# Patient Record
Sex: FEMALE | Race: WHITE
[De-identification: names, ages, dates, MRNs, and addresses within clinical notes are randomized per-mention and may not be internally consistent; named-entity substitution may affect disease eponyms.]

---

## 2017-01-26 NOTE — PD
HPI


Chief Complaint:  Skin Problem


Time Seen by Provider:  07:05


Travel History


International Travel<30 days:  No


Contact w/Intl Traveler<30days:  No


Traveled to known affect area:  No





History of Present Illness


HPI


31-year-old female here for evaluation of a rash.  The patient reports that she 

joined a gym out 2-1/2 weeks ago.  About 2 weeks ago she noticed a rash over 

her abdomen and lower back.  Rash is pruritic.  No fevers or chills.  She 

thought that she may have ringworm and started to apply Lotrimin cream.  She 

noticed an initial improvement, however the rash has not resolved.  No 

intraoral lesions.





PFSH


Past Medical History


Medical History:  Denies Significant Hx


Asthma:  Yes


Bipolar Disorder:  Yes (PER PT AS A CHILD, PT ON NO MEDS)


Depression:  No


Diabetes:  No


Diminished Hearing:  No


Immunizations Current:  Yes


Seizures:  No


Tetanus Vaccination:  < 5 Years


Pregnant?:  Not Pregnant


:  4


Para:  1


Miscarriage:  1


:  1


Ectopic Pregnancy:  Yes (L FALLOPIAN TUBE REMOVED)


Tubal Ligation:  Yes





Past Surgical History


Arteriovenous Shunt:  No


Gynecologic Surgery:  Yes (FALLOPIAN TUBE REMOVAL)


Oral Surgery:  Yes (DENTAL ABSCESS)





Social History


Alcohol Use:  No


Tobacco Use:  Yes (/2 ppd)


Substance Use:  No





Allergies-Medications


(Allergen,Severity, Reaction):  


Coded Allergies:  


     No Known Allergies (Verified , 16)


Reported Meds & Prescriptions





Reported Meds & Active Scripts


Active


No Active Prescriptions or Reported Medications    








Review of Systems


Except as stated in HPI:  all other systems reviewed are Neg





Physical Exam


Narrative


GENERAL: Pleasant, well-developed, well-nourished, no acute distress.


SKIN: Warm and dry.  Numerous circular/scaly lesions on anterior and posterior 

mid section about the size of a dime, no warmth or erythema.  No petechiae.


HEAD: Atraumatic. Normocephalic. 


EYES: Pupils equal and round. No scleral icterus. No injection or drainage. 


ENT: Mucous membranes pink and moist.  No intraoral lesions.


NECK: Trachea midline. No JVD.  No nuchal rigidity.


CARDIOVASCULAR: Regular rate and rhythm.  


RESPIRATORY: No accessory muscle use. Clear to auscultation. Breath sounds 

equal bilaterally. 


GASTROINTESTINAL: Abdomen soft, non-tender, nondistended. 


MUSCULOSKELETAL: No obvious deformities. No clubbing.  No cyanosis.  No edema. 


NEUROLOGICAL: Awake and alert. No obvious cranial nerve deficits.  Motor 

grossly within normal limits. Normal speech.


PSYCHIATRIC: Appropriate mood and affect; insight and judgment normal.





Data


Data


Last Documented VS





Vital Signs








  Date Time  Temp Pulse Resp B/P Pulse Ox O2 Delivery O2 Flow Rate FiO2


 


17 07:07 98.0 86 16 139/91 98   











MDM


Medical Decision Making


Medical Screen Exam Complete:  Yes


Emergency Medical Condition:  Yes


Medical Record Reviewed:  Yes


Differential Diagnosis


Tenia corporis, insect bites, erythema multiforme, cellulitis unlikely, Guillermo 

Tacos syndrome not likely,


Narrative Course


This is a 31-year-old female who is here for evaluation of a rash.  There are 

numerous circular/scaly areas on the patient's mid section with raised borders.

  The appearance of the rash resembles tinea.  There are no intraoral lesions.  

Plan is to start the patient on clotrimazole cream, steroids, and Benadryl.  PMD

/dermatology follow-up this week.  She was informed on when to return to the 

emergency department.  She verbalizes understanding and agreement with plan.





Diagnosis





 Primary Impression:  


 Rash


Referrals:  


Dermatologist


3 days





Primary Care Physician


3 days


Patient Instructions:  General Instructions


Departure Forms:  Tests/Procedures





***Additional Instructions:


Take medications as prescribed.


Follow-up with a primary care physician or dermatologist this week.


Return to the emergency department if worsening symptoms or any other concerns.


Scripts


Prednisone 50 Mg Tab50 Mg PO DAILY  5 Days  Ref 0


   Prov:Addi Ozuna MD         17 


Clotrimazole Topical 1% Soln1 Applic TOPICAL BID  #30 ML  Ref 2


   Prov:Addi Ozuna MD         17


Disposition:  01 DISCHARGE HOME


Condition:  Stable








Addi Ozuna MD 2017 07:15

## 2018-03-12 ENCOUNTER — HOSPITAL ENCOUNTER (EMERGENCY)
Dept: HOSPITAL 17 - PHED | Age: 33
Discharge: HOME | End: 2018-03-12
Payer: SELF-PAY

## 2018-03-12 VITALS — SYSTOLIC BLOOD PRESSURE: 149 MMHG | DIASTOLIC BLOOD PRESSURE: 78 MMHG

## 2018-03-12 VITALS
SYSTOLIC BLOOD PRESSURE: 155 MMHG | HEART RATE: 98 BPM | OXYGEN SATURATION: 98 % | RESPIRATION RATE: 18 BRPM | DIASTOLIC BLOOD PRESSURE: 70 MMHG

## 2018-03-12 VITALS
TEMPERATURE: 98.3 F | RESPIRATION RATE: 18 BRPM | HEART RATE: 102 BPM | OXYGEN SATURATION: 97 % | SYSTOLIC BLOOD PRESSURE: 165 MMHG | DIASTOLIC BLOOD PRESSURE: 83 MMHG

## 2018-03-12 VITALS — OXYGEN SATURATION: 98 %

## 2018-03-12 DIAGNOSIS — J45.909: ICD-10-CM

## 2018-03-12 DIAGNOSIS — R07.89: Primary | ICD-10-CM

## 2018-03-12 DIAGNOSIS — F17.210: ICD-10-CM

## 2018-03-12 DIAGNOSIS — R00.0: ICD-10-CM

## 2018-03-12 LAB
ALBUMIN SERPL-MCNC: 3.2 GM/DL (ref 3.4–5)
ALP SERPL-CCNC: 97 U/L (ref 45–117)
ALT SERPL-CCNC: 24 U/L (ref 10–53)
AST SERPL-CCNC: 13 U/L (ref 15–37)
BASOPHILS # BLD AUTO: 0 TH/MM3 (ref 0–0.2)
BASOPHILS NFR BLD: 0.6 % (ref 0–2)
BILIRUB SERPL-MCNC: 0.1 MG/DL (ref 0.2–1)
BUN SERPL-MCNC: 10 MG/DL (ref 7–18)
CALCIUM SERPL-MCNC: 8.2 MG/DL (ref 8.5–10.1)
CHLORIDE SERPL-SCNC: 105 MEQ/L (ref 98–107)
CREAT SERPL-MCNC: 0.68 MG/DL (ref 0.5–1)
EOSINOPHIL # BLD: 0.2 TH/MM3 (ref 0–0.4)
EOSINOPHIL NFR BLD: 2.4 % (ref 0–4)
ERYTHROCYTE [DISTWIDTH] IN BLOOD BY AUTOMATED COUNT: 14 % (ref 11.6–17.2)
GFR SERPLBLD BASED ON 1.73 SQ M-ARVRAT: 100 ML/MIN (ref 89–?)
GLUCOSE SERPL-MCNC: 98 MG/DL (ref 74–106)
HCO3 BLD-SCNC: 28.5 MEQ/L (ref 21–32)
HCT VFR BLD CALC: 40.2 % (ref 35–46)
HGB BLD-MCNC: 13.5 GM/DL (ref 11.6–15.3)
INR PPP: 1.1 RATIO
LYMPHOCYTES # BLD AUTO: 1.5 TH/MM3 (ref 1–4.8)
LYMPHOCYTES NFR BLD AUTO: 19.4 % (ref 9–44)
MCH RBC QN AUTO: 29.3 PG (ref 27–34)
MCHC RBC AUTO-ENTMCNC: 33.5 % (ref 32–36)
MCV RBC AUTO: 87.4 FL (ref 80–100)
MONOCYTE #: 0.9 TH/MM3 (ref 0–0.9)
MONOCYTES NFR BLD: 10.8 % (ref 0–8)
NEUTROPHILS # BLD AUTO: 5.4 TH/MM3 (ref 1.8–7.7)
NEUTROPHILS NFR BLD AUTO: 66.8 % (ref 16–70)
PLATELET # BLD: 265 TH/MM3 (ref 150–450)
PMV BLD AUTO: 7.5 FL (ref 7–11)
PROT SERPL-MCNC: 7.2 GM/DL (ref 6.4–8.2)
PROTHROMBIN TIME: 11 SEC (ref 9.8–11.6)
RBC # BLD AUTO: 4.6 MIL/MM3 (ref 4–5.3)
SODIUM SERPL-SCNC: 139 MEQ/L (ref 136–145)
TROPONIN I SERPL-MCNC: (no result) NG/ML (ref 0.02–0.05)
WBC # BLD AUTO: 8 TH/MM3 (ref 4–11)

## 2018-03-12 PROCEDURE — 82550 ASSAY OF CK (CPK): CPT

## 2018-03-12 PROCEDURE — 99285 EMERGENCY DEPT VISIT HI MDM: CPT

## 2018-03-12 PROCEDURE — 71045 X-RAY EXAM CHEST 1 VIEW: CPT

## 2018-03-12 PROCEDURE — 85730 THROMBOPLASTIN TIME PARTIAL: CPT

## 2018-03-12 PROCEDURE — 85610 PROTHROMBIN TIME: CPT

## 2018-03-12 PROCEDURE — 84484 ASSAY OF TROPONIN QUANT: CPT

## 2018-03-12 PROCEDURE — 85025 COMPLETE CBC W/AUTO DIFF WBC: CPT

## 2018-03-12 PROCEDURE — 93005 ELECTROCARDIOGRAM TRACING: CPT

## 2018-03-12 PROCEDURE — 84443 ASSAY THYROID STIM HORMONE: CPT

## 2018-03-12 PROCEDURE — 80053 COMPREHEN METABOLIC PANEL: CPT

## 2018-03-12 NOTE — RADRPT
EXAM DATE/TIME:  03/12/2018 17:40 

 

[facility] COMPARISON:     

No previous studies available for comparison.

 

                     

INDICATIONS :     

Chest pain.

                     

 

MEDICAL HISTORY :     

None.          

 

SURGICAL HISTORY :     

None.   

 

ENCOUNTER:     

Initial                                        

 

ACUITY:     

1 day      

 

PAIN SCORE:     

4/10

 

LOCATION:     

Bilateral chest 

 

FINDINGS:     

A single view of the chest demonstrates the lungs to be symmetrically aerated without evidence of mas
s, infiltrate or effusion.  The cardiomediastinal contours are unremarkable.  Osseous structures are 
intact.

 

CONCLUSION:     

No evidence of acute cardiopulmonary disease.

 

 

 

 Jaron Fraire MD on March 12, 2018 at 17:59           

Board Certified Radiologist.

 This report was verified electronically.

## 2018-03-12 NOTE — PD
HPI


Chief Complaint:  Chest Pain


Time Seen by Provider:  17:00


Travel History


International Travel<30 days:  No


Contact w/Intl Traveler<30days:  No


Traveled to known affect area:  No





History of Present Illness


HPI


32-year-old female complains of chest pain.  Patient states that the chest pain 

started a week ago.  Patient states that  the chest pain has been persistent 

since then.  Patient states of chest pain is not associated with exertion.  

Patient denies any coughing and congestion fever chills.  Patient states that 

the chest pains his pressure pain across the chest with radiation to left arm.  

Patient states that she has nausea but no vomiting diarrhea.  Patient denies 

history of CAD.  Patient states that she was told that she had elevated blood 

pressure in the past however not on medication.  Patient denies history of 

diabetes or hyperlipidemia.  Patient states that she feel numbness of the feet 

and blurred vision intermittently for the past week.  Patient states that she 

was having tachycardia for the past week or so.  Patient has history of anxiety 

in the past.





PFSH


Past Medical History


Asthma:  Yes


Bipolar Disorder:  Yes (PER PT AS A CHILD, PT ON NO MEDS)


Depression:  No


Diabetes:  No


Diminished Hearing:  No


Immunizations Current:  Yes


Seizures:  No


:  4


Para:  1


Miscarriage:  1


:  1


Ectopic Pregnancy:  Yes (L FALLOPIAN TUBE REMOVED)


Tubal Ligation:  Yes





Past Surgical History


Arteriovenous Shunt:  No


Gynecologic Surgery:  Yes (FALLOPIAN TUBE REMOVAL)


Oral Surgery:  Yes (DENTAL ABSCESS)





Social History


Alcohol Use:  No


Tobacco Use:  Yes (/2 ppd)


Substance Use:  No





Allergies-Medications


(Allergen,Severity, Reaction):  


Coded Allergies:  


     No Known Allergies (Verified , 16)


Reported Meds & Prescriptions





Reported Meds & Active Scripts


Active








Review of Systems


General / Constitutional:  No: Fever


Eyes:  No: Visual changes


HENT:  No: Headaches


Cardiovascular:  Positive: Chest Pain or Discomfort, Tachycardia


Respiratory:  No: Shortness of Breath


Gastrointestinal:  No: Abdominal Pain


Genitourinary:  No: Dysuria


Musculoskeletal:  No: Pain


Skin:  No Rash


Neurologic:  No: Weakness


Psychiatric:  No: Depression


Endocrine:  No: Polydipsia


Hematologic/Lymphatic:  No: Easy Bruising





Physical Exam


Narrative


GENERAL: Well-nourished, well-developed patient.


SKIN: Focused skin assessment warm/dry.


HEAD: Normocephalic.


EYES: No scleral icterus. No injection or drainage. 


NECK: Supple, trachea midline. No JVD or lymphadenopathy.


CARDIOVASCULAR: Regular rate and rhythm without murmurs, gallops, or rubs. 


RESPIRATORY: Breath sounds equal bilaterally. No accessory muscle use.


GASTROINTESTINAL: Abdomen soft, non-tender, nondistended. 


MUSCULOSKELETAL: No cyanosis, or edema. 


BACK: Nontender without obvious deformity. No CVA tenderness.


Neurologic exam normal.





Data


Data


Last Documented VS





Vital Signs








  Date Time  Temp Pulse Resp B/P (MAP) Pulse Ox O2 Delivery O2 Flow Rate FiO2


 


3/12/18 17:25     98 Room Air  


 


3/12/18 17:10 98.3 102 18 165/83 (110)    








Orders





 Orders


Electrocardiogram (3/12/18 17:06)


Complete Blood Count With Diff (3/12/18 17:)


Comprehensive Metabolic Panel (3/12/18 17:06)


Creatine Kinase (Cpk) (3/12/18 17:06)


Troponin I (3/12/18 17:06)


Prothrombin Time / Inr (Pt) (3/12/18 17:)


Act Partial Throm Time (Ptt) (3/12/18 17:06)


Thyroid Stimulating Hormone (3/12/18 17:06)


Chest, Single Ap (3/12/18 17:06)


Iv Access Insert/Monitor (3/12/18 17:06)


Ecg Monitoring (3/12/18 17:06)


Oximetry (3/12/18 17:06)





Labs





Laboratory Tests








Test


  3/12/18


17:26 3/12/18


17:27


 


Prothrombin Time 11.0 SEC  


 


Prothromb Time International


Ratio 1.1 RATIO 


  


 


 


Activated Partial


Thromboplast Time 30.3 SEC 


  


 


 


White Blood Count  8.0 TH/MM3 


 


Red Blood Count  4.60 MIL/MM3 


 


Hemoglobin  13.5 GM/DL 


 


Hematocrit  40.2 % 


 


Mean Corpuscular Volume  87.4 FL 


 


Mean Corpuscular Hemoglobin  29.3 PG 


 


Mean Corpuscular Hemoglobin


Concent 


  33.5 % 


 


 


Red Cell Distribution Width  14.0 % 


 


Platelet Count  265 TH/MM3 


 


Mean Platelet Volume  7.5 FL 


 


Neutrophils (%) (Auto)  66.8 % 


 


Lymphocytes (%) (Auto)  19.4 % 


 


Monocytes (%) (Auto)  10.8 % 


 


Eosinophils (%) (Auto)  2.4 % 


 


Basophils (%) (Auto)  0.6 % 


 


Neutrophils # (Auto)  5.4 TH/MM3 


 


Lymphocytes # (Auto)  1.5 TH/MM3 


 


Monocytes # (Auto)  0.9 TH/MM3 


 


Eosinophils # (Auto)  0.2 TH/MM3 


 


Basophils # (Auto)  0.0 TH/MM3 


 


CBC Comment  DIFF FINAL 


 


Differential Comment   


 


Blood Urea Nitrogen  10 MG/DL 


 


Creatinine  0.68 MG/DL 


 


Random Glucose  98 MG/DL 


 


Total Protein  7.2 GM/DL 


 


Albumin  3.2 GM/DL 


 


Calcium Level  8.2 MG/DL 


 


Alkaline Phosphatase  97 U/L 


 


Aspartate Amino Transf


(AST/SGOT) 


  13 U/L 


 


 


Alanine Aminotransferase


(ALT/SGPT) 


  24 U/L 


 


 


Total Bilirubin  0.1 MG/DL 


 


Sodium Level  139 MEQ/L 


 


Potassium Level  3.8 MEQ/L 


 


Chloride Level  105 MEQ/L 


 


Carbon Dioxide Level  28.5 MEQ/L 


 


Anion Gap  6 MEQ/L 


 


Estimat Glomerular Filtration


Rate 


  100 ML/MIN 


 


 


Total Creatine Kinase  68 U/L 


 


Troponin I


  


  LESS THAN 0.02


NG/ML


 


Thyroid Stimulating Hormone


3rd Gen 


  0.942 uIU/ML 


 











MDM


Medical Decision Making


Medical Screen Exam Complete:  Yes


Emergency Medical Condition:  Yes


Interpretation(s)


17 11 PM.  EKG shows sinus rhythm nonspecific ST-T wave changes.


Last Impressions








Chest X-Ray 3/12/18 1706 Signed





Impressions: 





 Service Date/Time:  2018 17:40 - CONCLUSION:  No evidence of 





 acute cardiopulmonary disease.     Jaron Fraire MD 





18 11 PM.  CBC within normal limits.  CMP within normal limits.  Cardiac 

enzymes are normal.  TSH normal.


Differential Diagnosis


Differential diagnosis including anxiety, musculoskeletal, angina, MI, PE, 

pneumothorax.


Narrative Course


32-year-old female with numbness of the feet, blurred vision, tachycardia, 

chest pressure, nausea.





Diagnosis





 Primary Impression:  


 Atypical chest pain


Patient Instructions:  General Instructions





***Additional Instructions:  


Advised patient to follow with local physician for blood pressure check.  

Return if increasing chest pain shortness of breath.


***Med/Other Pt SpecificInfo:  No Meds Exist/No RX given


Disposition:  01 DISCHARGE HOME


Condition:  Stable











Ramiro Wood MD Mar 12, 2018 17:11

## 2018-03-13 NOTE — EKG
Date Performed: 03/12/2018       Time Performed: 16:59:10

 

PTAGE:      32 years

 

EKG:      SINUS TACHYCARDIA ABNORMAL RHYTHM ECG 

 

NO PREVIOUS TRACING            

 

DOCTOR:   Thony Ojeda  Interpretating Date/Time  03/13/2018 20:10:54

## 2018-03-14 ENCOUNTER — HOSPITAL ENCOUNTER (EMERGENCY)
Dept: HOSPITAL 17 - PHED | Age: 33
Discharge: HOME | End: 2018-03-14
Payer: SELF-PAY

## 2018-03-14 VITALS
TEMPERATURE: 98.3 F | SYSTOLIC BLOOD PRESSURE: 153 MMHG | OXYGEN SATURATION: 96 % | DIASTOLIC BLOOD PRESSURE: 81 MMHG | RESPIRATION RATE: 16 BRPM | HEART RATE: 99 BPM

## 2018-03-14 VITALS — WEIGHT: 278.88 LBS | HEIGHT: 68 IN | BODY MASS INDEX: 42.27 KG/M2

## 2018-03-14 DIAGNOSIS — J45.21: Primary | ICD-10-CM

## 2018-03-14 DIAGNOSIS — F17.210: ICD-10-CM

## 2018-03-14 PROCEDURE — 99283 EMERGENCY DEPT VISIT LOW MDM: CPT

## 2018-03-14 NOTE — PD
HPI


.


Cough and congestion


Chief Complaint:  Cold / Flu Symptoms


Time Seen by Provider:  09:46


Travel History


International Travel<30 days:  No


Contact w/Intl Traveler<30days:  No


Traveled to known affect area:  No





History of Present Illness


HPI


Patient presents with a chief complaint of cough, congestion, wheezing and sore 

throat for the last 3 days.  She admits that she is a smoker.  She also states 

that she has a history of wheezing but is out of her inhaler.  She states that 

she has been trying to treat her symptoms with hot showers and over-the-counter 

cold medicines with no relief.  Associated symptoms include clear mucus 

production and subjective fevers and chills.





PFSH


Past Medical History


Asthma:  Yes


Bipolar Disorder:  Yes (PER PT AS A CHILD, PT ON NO MEDS)


Depression:  No


Diabetes:  No


Diminished Hearing:  No


Immunizations Current:  Yes


Seizures:  No


Tetanus Vaccination:  < 5 Years


Influenza Vaccination:  No


Pregnant?:  Not Pregnant


LMP:  2.5 WEEKS


:  4


Para:  1


Miscarriage:  1


:  1


Ectopic Pregnancy:  Yes (L FALLOPIAN TUBE REMOVED)


Tubal Ligation:  Yes





Past Surgical History


Arteriovenous Shunt:  No


Gynecologic Surgery:  Yes (FALLOPIAN TUBE REMOVAL)


Oral Surgery:  Yes (DENTAL ABSCESS)





Social History


Alcohol Use:  No


Tobacco Use:  Yes (states 5 cigs a day)


Substance Use:  No





Allergies-Medications


(Allergen,Severity, Reaction):  


Coded Allergies:  


     No Known Allergies (Verified  Adverse Reaction, Unknown, 3/14/18)


Reported Meds & Prescriptions





Reported Meds & Active Scripts


Active


Reported


Ventolin Hfa 18 GM Inh (Albuterol Sulfate) 90 Mcg/Act Aer 2 Puff INH Q4-6H PRN








Review of Systems


Except as stated in HPI:  all other systems reviewed are Neg


General / Constitutional:  Positive: Fever, Chills


HENT:  Positive: Congestion


Respiratory:  Positive: Cough, Wheezing





Physical Exam


Narrative


GENERAL: Awake and alert and in no acute distress.


SKIN:  warm/dry.  Normal color and turgor.


HEAD: Normocephalic.  Atraumatic.


EYES: Pupils equal and round. No scleral icterus. No injection or drainage. 


ENT: No nasal bleeding or discharge.  Mucous membranes pink and moist.


NECK: Trachea midline.  Full range of motion without pain.. 


CARDIOVASCULAR: Regular rate and rhythm.  Heart sounds normal.


RESPIRATORY: No accessory muscle use.  Good air movement.  Faint expiratory 

wheezing which is heard loudest anteriorly over the trachea.  Breath sounds 

equal bilaterally.  Distally


MUSCULOSKELETAL: No obvious deformities. 


NEUROLOGICAL: Awake and alert. No obvious cranial nerve deficits.  Motor 

grossly within normal limits. Normal speech.


PSYCHIATRIC: Appropriate mood and affect; insight and judgment normal.





Data


Data


Last Documented VS





Vital Signs








  Date Time  Temp Pulse Resp B/P (MAP) Pulse Ox O2 Delivery O2 Flow Rate FiO2


 


3/14/18 09:32  90 16  97 Room Air  


 


3/14/18 09:14 98.3   153/81 (105)    











[facility]


Medical Decision Making


Medical Screen Exam Complete:  Yes


Emergency Medical Condition:  Yes


Medical Record Reviewed:  Yes (patient was seen here on 3/12 for chest pain.)


Differential Diagnosis


My differential diagnosis includes but is not limited to asthma, COPD, 

bronchitis, pneumonia, CHF


Narrative Course


This patient presents with cough, congestion and wheezing.  She has a history 

of asthma and is out of her MDI.  She is also a smoker.








Vital Signs








  Date Time  Temp Pulse Resp B/P (MAP) Pulse Ox O2 Delivery O2 Flow Rate FiO2


 


3/14/18 09:32  90 16  97 Room Air  


 


3/14/18 09:14 98.3 99 16 153/81 (105) 96   








She is in no respiratory distress.  She will be discharged to home with a 

prescription for an MDI.





Diagnosis





 Primary Impression:  


 Asthma


 Qualified Codes:  J45.21 - Mild intermittent asthma with (acute) exacerbation


Patient Instructions:  Asthma (DC), General Instructions





***Additional Instructions:  


I recommend the use of a Neti Pot.


You may use a nasal spray such as Afrin for up to 3 days as needed for nasal 

congestion.


You may take an over-the-counter antihistamine such as Zyrtec, Allegra or 

Claritin as needed for runny secretions.


You may take pseudoephedrine as needed for congestion.  You will need to sign 

for this at the pharmacy.


You may take plain Mucinex, 1200 mg twice a day as needed for thick secretions.


You may take a cough syrup such as Delsym as needed for cough.


Motrin as needed for fever and body aches.


Throat lozenges/sprays as needed for sore throat.


Warm salt water gargles for sore throat.


Hot tea with lemon and honey also helps soothe a sore throat.


***Med/Other Pt SpecificInfo:  Prescription(s) given


Scripts


Albuterol 18 GM Inh (Ventolin Hfa 18 GM Inh) 90 Mcg/Act Aer


2 PUFF INH Q4-6H Y for SHORTNESS OF BREATH, #1 INHALER 0 Refills


   Prov: Jodi Biggs MD         3/14/18


Disposition:  01 DISCHARGE HOME


Condition:  Stable











Jodi Biggs MD Mar 14, 2018 10:36